# Patient Record
Sex: FEMALE | Race: WHITE | URBAN - METROPOLITAN AREA
[De-identification: names, ages, dates, MRNs, and addresses within clinical notes are randomized per-mention and may not be internally consistent; named-entity substitution may affect disease eponyms.]

---

## 2018-04-11 ENCOUNTER — ANESTHESIA EVENT (OUTPATIENT)
Dept: SURGERY | Facility: CLINIC | Age: 58
End: 2018-04-11
Payer: COMMERCIAL

## 2018-04-11 RX ORDER — SULFASALAZINE 500 MG/1
500 TABLET ORAL 2 TIMES DAILY
COMMUNITY

## 2018-04-12 ENCOUNTER — HOSPITAL ENCOUNTER (OUTPATIENT)
Facility: CLINIC | Age: 58
Discharge: HOME | End: 2018-04-12
Attending: OPHTHALMOLOGY | Admitting: OPHTHALMOLOGY
Payer: COMMERCIAL

## 2018-04-12 ENCOUNTER — SURGERY (OUTPATIENT)
Age: 58
End: 2018-04-12

## 2018-04-12 ENCOUNTER — ANESTHESIA (OUTPATIENT)
Dept: SURGERY | Facility: CLINIC | Age: 58
End: 2018-04-12
Payer: COMMERCIAL

## 2018-04-12 VITALS
DIASTOLIC BLOOD PRESSURE: 84 MMHG | BODY MASS INDEX: 28.72 KG/M2 | OXYGEN SATURATION: 97 % | TEMPERATURE: 98.5 F | WEIGHT: 183 LBS | RESPIRATION RATE: 16 BRPM | SYSTOLIC BLOOD PRESSURE: 141 MMHG | HEIGHT: 67 IN

## 2018-04-12 DIAGNOSIS — Z98.890 POSTOPERATIVE EYE STATE: Primary | ICD-10-CM

## 2018-04-12 PROCEDURE — 71000012 ZZH RECOVERY PHASE 1 LEVEL 1 FIRST HR: Performed by: OPHTHALMOLOGY

## 2018-04-12 PROCEDURE — 71000013 ZZH RECOVERY PHASE 1 LEVEL 1 EA ADDTL HR: Performed by: OPHTHALMOLOGY

## 2018-04-12 PROCEDURE — 40000170 ZZH STATISTIC PRE-PROCEDURE ASSESSMENT II: Performed by: OPHTHALMOLOGY

## 2018-04-12 PROCEDURE — 25000128 H RX IP 250 OP 636: Performed by: ANESTHESIOLOGY

## 2018-04-12 PROCEDURE — 37000008 ZZH ANESTHESIA TECHNICAL FEE, 1ST 30 MIN: Performed by: OPHTHALMOLOGY

## 2018-04-12 PROCEDURE — 25000132 ZZH RX MED GY IP 250 OP 250 PS 637: Performed by: OPHTHALMOLOGY

## 2018-04-12 PROCEDURE — 36000058 ZZH SURGERY LEVEL 3 EA 15 ADDTL MIN: Performed by: OPHTHALMOLOGY

## 2018-04-12 PROCEDURE — 25000125 ZZHC RX 250: Performed by: NURSE ANESTHETIST, CERTIFIED REGISTERED

## 2018-04-12 PROCEDURE — 37000009 ZZH ANESTHESIA TECHNICAL FEE, EACH ADDTL 15 MIN: Performed by: OPHTHALMOLOGY

## 2018-04-12 PROCEDURE — 25000128 H RX IP 250 OP 636: Performed by: NURSE ANESTHETIST, CERTIFIED REGISTERED

## 2018-04-12 PROCEDURE — 36000056 ZZH SURGERY LEVEL 3 1ST 30 MIN: Performed by: OPHTHALMOLOGY

## 2018-04-12 PROCEDURE — 27210794 ZZH OR GENERAL SUPPLY STERILE: Performed by: OPHTHALMOLOGY

## 2018-04-12 PROCEDURE — 25000125 ZZHC RX 250: Performed by: OPHTHALMOLOGY

## 2018-04-12 PROCEDURE — 25000125 ZZHC RX 250: Performed by: ANESTHESIOLOGY

## 2018-04-12 PROCEDURE — 71000027 ZZH RECOVERY PHASE 2 EACH 15 MINS: Performed by: OPHTHALMOLOGY

## 2018-04-12 RX ORDER — MEPERIDINE HYDROCHLORIDE 25 MG/ML
12.5 INJECTION INTRAMUSCULAR; INTRAVENOUS; SUBCUTANEOUS
Status: DISCONTINUED | OUTPATIENT
Start: 2018-04-12 | End: 2018-04-12 | Stop reason: HOSPADM

## 2018-04-12 RX ORDER — NEOMYCIN SULFATE, POLYMYXIN B SULFATE AND DEXAMETHASONE 3.5; 10000; 1 MG/ML; [USP'U]/ML; MG/ML
1 SUSPENSION/ DROPS OPHTHALMIC 4 TIMES DAILY
Qty: 1 BOTTLE | Refills: 0 | Status: SHIPPED | OUTPATIENT
Start: 2018-04-12 | End: 2018-04-12

## 2018-04-12 RX ORDER — DEXAMETHASONE SODIUM PHOSPHATE 4 MG/ML
INJECTION, SOLUTION INTRA-ARTICULAR; INTRALESIONAL; INTRAMUSCULAR; INTRAVENOUS; SOFT TISSUE PRN
Status: DISCONTINUED | OUTPATIENT
Start: 2018-04-12 | End: 2018-04-12

## 2018-04-12 RX ORDER — ERYTHROMYCIN 5 MG/G
OINTMENT OPHTHALMIC
Qty: 3.5 G | Refills: 0 | Status: SHIPPED | OUTPATIENT
Start: 2018-04-12 | End: 2018-12-27

## 2018-04-12 RX ORDER — FENTANYL CITRATE 50 UG/ML
25-50 INJECTION, SOLUTION INTRAMUSCULAR; INTRAVENOUS EVERY 5 MIN PRN
Status: DISCONTINUED | OUTPATIENT
Start: 2018-04-12 | End: 2018-04-12 | Stop reason: HOSPADM

## 2018-04-12 RX ORDER — SODIUM CHLORIDE, SODIUM LACTATE, POTASSIUM CHLORIDE, CALCIUM CHLORIDE 600; 310; 30; 20 MG/100ML; MG/100ML; MG/100ML; MG/100ML
INJECTION, SOLUTION INTRAVENOUS CONTINUOUS
Status: DISCONTINUED | OUTPATIENT
Start: 2018-04-12 | End: 2018-04-12 | Stop reason: HOSPADM

## 2018-04-12 RX ORDER — ONDANSETRON 4 MG/1
4 TABLET, ORALLY DISINTEGRATING ORAL EVERY 30 MIN PRN
Status: DISCONTINUED | OUTPATIENT
Start: 2018-04-12 | End: 2018-04-12 | Stop reason: HOSPADM

## 2018-04-12 RX ORDER — TOBRAMYCIN AND DEXAMETHASONE 3; 1 MG/ML; MG/ML
1 SUSPENSION/ DROPS OPHTHALMIC 4 TIMES DAILY
Qty: 1 BOTTLE | Refills: 0 | Status: SHIPPED | OUTPATIENT
Start: 2018-04-12 | End: 2018-12-27

## 2018-04-12 RX ORDER — PROPOFOL 10 MG/ML
INJECTION, EMULSION INTRAVENOUS CONTINUOUS PRN
Status: DISCONTINUED | OUTPATIENT
Start: 2018-04-12 | End: 2018-04-12

## 2018-04-12 RX ORDER — ERYTHROMYCIN 5 MG/G
OINTMENT OPHTHALMIC PRN
Status: DISCONTINUED | OUTPATIENT
Start: 2018-04-12 | End: 2018-04-12 | Stop reason: HOSPADM

## 2018-04-12 RX ORDER — HYDROCODONE BITARTRATE AND ACETAMINOPHEN 5; 325 MG/1; MG/1
1 TABLET ORAL EVERY 6 HOURS PRN
Qty: 10 TABLET | Refills: 0 | Status: SHIPPED | OUTPATIENT
Start: 2018-04-12 | End: 2018-12-27

## 2018-04-12 RX ORDER — LIDOCAINE HYDROCHLORIDE AND EPINEPHRINE 10; 10 MG/ML; UG/ML
INJECTION, SOLUTION INFILTRATION; PERINEURAL PRN
Status: DISCONTINUED | OUTPATIENT
Start: 2018-04-12 | End: 2018-04-12 | Stop reason: HOSPADM

## 2018-04-12 RX ORDER — LIDOCAINE HYDROCHLORIDE 20 MG/ML
INJECTION, SOLUTION INFILTRATION; PERINEURAL PRN
Status: DISCONTINUED | OUTPATIENT
Start: 2018-04-12 | End: 2018-04-12

## 2018-04-12 RX ORDER — PROPOFOL 10 MG/ML
INJECTION, EMULSION INTRAVENOUS PRN
Status: DISCONTINUED | OUTPATIENT
Start: 2018-04-12 | End: 2018-04-12

## 2018-04-12 RX ORDER — NALOXONE HYDROCHLORIDE 0.4 MG/ML
.1-.4 INJECTION, SOLUTION INTRAMUSCULAR; INTRAVENOUS; SUBCUTANEOUS
Status: DISCONTINUED | OUTPATIENT
Start: 2018-04-12 | End: 2018-04-12 | Stop reason: HOSPADM

## 2018-04-12 RX ORDER — SODIUM CHLORIDE, SODIUM LACTATE, POTASSIUM CHLORIDE, CALCIUM CHLORIDE 600; 310; 30; 20 MG/100ML; MG/100ML; MG/100ML; MG/100ML
INJECTION, SOLUTION INTRAVENOUS CONTINUOUS PRN
Status: DISCONTINUED | OUTPATIENT
Start: 2018-04-12 | End: 2018-04-12

## 2018-04-12 RX ORDER — ONDANSETRON 2 MG/ML
4 INJECTION INTRAMUSCULAR; INTRAVENOUS EVERY 30 MIN PRN
Status: DISCONTINUED | OUTPATIENT
Start: 2018-04-12 | End: 2018-04-12 | Stop reason: HOSPADM

## 2018-04-12 RX ORDER — HYDROCODONE BITARTRATE AND ACETAMINOPHEN 5; 325 MG/1; MG/1
1 TABLET ORAL ONCE
Status: COMPLETED | OUTPATIENT
Start: 2018-04-12 | End: 2018-04-12

## 2018-04-12 RX ORDER — FENTANYL CITRATE 50 UG/ML
25-50 INJECTION, SOLUTION INTRAMUSCULAR; INTRAVENOUS
Status: DISCONTINUED | OUTPATIENT
Start: 2018-04-12 | End: 2018-04-12 | Stop reason: HOSPADM

## 2018-04-12 RX ORDER — FENTANYL CITRATE 50 UG/ML
INJECTION, SOLUTION INTRAMUSCULAR; INTRAVENOUS PRN
Status: DISCONTINUED | OUTPATIENT
Start: 2018-04-12 | End: 2018-04-12

## 2018-04-12 RX ORDER — HYDROMORPHONE HYDROCHLORIDE 1 MG/ML
.3-.5 INJECTION, SOLUTION INTRAMUSCULAR; INTRAVENOUS; SUBCUTANEOUS EVERY 10 MIN PRN
Status: DISCONTINUED | OUTPATIENT
Start: 2018-04-12 | End: 2018-04-12 | Stop reason: HOSPADM

## 2018-04-12 RX ADMIN — PROPOFOL 200 MCG/KG/MIN: 10 INJECTION, EMULSION INTRAVENOUS at 08:38

## 2018-04-12 RX ADMIN — PHENYLEPHRINE HYDROCHLORIDE 100 MCG: 10 INJECTION, SOLUTION INTRAMUSCULAR; INTRAVENOUS; SUBCUTANEOUS at 08:45

## 2018-04-12 RX ADMIN — LIDOCAINE HYDROCHLORIDE 60 MG: 20 INJECTION, SOLUTION INFILTRATION; PERINEURAL at 08:36

## 2018-04-12 RX ADMIN — ONDANSETRON 4 MG: 4 TABLET, ORALLY DISINTEGRATING ORAL at 12:22

## 2018-04-12 RX ADMIN — FENTANYL CITRATE 50 MCG: 50 INJECTION, SOLUTION INTRAMUSCULAR; INTRAVENOUS at 10:01

## 2018-04-12 RX ADMIN — SODIUM CHLORIDE, POTASSIUM CHLORIDE, SODIUM LACTATE AND CALCIUM CHLORIDE: 600; 310; 30; 20 INJECTION, SOLUTION INTRAVENOUS at 08:35

## 2018-04-12 RX ADMIN — SODIUM CHLORIDE, POTASSIUM CHLORIDE, SODIUM LACTATE AND CALCIUM CHLORIDE: 600; 310; 30; 20 INJECTION, SOLUTION INTRAVENOUS at 09:36

## 2018-04-12 RX ADMIN — PHENYLEPHRINE HYDROCHLORIDE 100 MCG: 10 INJECTION, SOLUTION INTRAMUSCULAR; INTRAVENOUS; SUBCUTANEOUS at 09:13

## 2018-04-12 RX ADMIN — HYDROMORPHONE HYDROCHLORIDE 0.5 MG: 1 INJECTION, SOLUTION INTRAMUSCULAR; INTRAVENOUS; SUBCUTANEOUS at 10:22

## 2018-04-12 RX ADMIN — PROPOFOL 200 MG: 10 INJECTION, EMULSION INTRAVENOUS at 08:36

## 2018-04-12 RX ADMIN — ERYTHROMYCIN 2 G: 5 OINTMENT OPHTHALMIC at 09:23

## 2018-04-12 RX ADMIN — DEXAMETHASONE SODIUM PHOSPHATE 4 MG: 4 INJECTION, SOLUTION INTRA-ARTICULAR; INTRALESIONAL; INTRAMUSCULAR; INTRAVENOUS; SOFT TISSUE at 08:38

## 2018-04-12 RX ADMIN — LIDOCAINE HYDROCHLORIDE AND EPINEPHRINE 9 ML: 10; 10 INJECTION, SOLUTION INFILTRATION; PERINEURAL at 08:55

## 2018-04-12 RX ADMIN — HYDROMORPHONE HYDROCHLORIDE 0.5 MG: 1 INJECTION, SOLUTION INTRAMUSCULAR; INTRAVENOUS; SUBCUTANEOUS at 09:48

## 2018-04-12 RX ADMIN — MIDAZOLAM 2 MG: 1 INJECTION INTRAMUSCULAR; INTRAVENOUS at 08:35

## 2018-04-12 RX ADMIN — PHENYLEPHRINE HYDROCHLORIDE 100 MCG: 10 INJECTION, SOLUTION INTRAMUSCULAR; INTRAVENOUS; SUBCUTANEOUS at 09:00

## 2018-04-12 RX ADMIN — FENTANYL CITRATE 50 MCG: 50 INJECTION, SOLUTION INTRAMUSCULAR; INTRAVENOUS at 08:35

## 2018-04-12 RX ADMIN — HYDROCODONE BITARTRATE AND ACETAMINOPHEN 1 TABLET: 5; 325 TABLET ORAL at 11:21

## 2018-04-12 RX ADMIN — PHENYLEPHRINE HYDROCHLORIDE 100 MCG: 10 INJECTION, SOLUTION INTRAMUSCULAR; INTRAVENOUS; SUBCUTANEOUS at 09:17

## 2018-04-12 NOTE — IP AVS SNAPSHOT
MRN:[id number]                      After Visit Summary   4/12/2018    Sybil Hess    MRN: [id number]           Thank you!     Thank you for choosing [facility] for your care. Our goal is always to provide you with excellent care. Hearing back from our patients is one way we can continue to improve our services. Please take a few minutes to complete the written survey that you may receive in the mail after you visit with us. Thank you!        Patient Information     Date Of Birth          1960        About your hospital stay     You were admitted on:  April 12, 2018 [facility]    You were discharged on:  April 12, 2018       Who to Call     For medical emergencies, please call 911.  For non-urgent questions about your medical care, please call your primary care provider or clinic, [phone]  For questions related to your surgery, please call your surgery clinic        Attending Provider     Provider Specialty    Jose Marx MD Ophthalmology       Primary Care Provider Office Phone # Fax #    Patricia ISAIAS Olmos [phone]      After Care Instructions     Discharge Medication Instructions       Do NOT take aspirin or medications containing NSAIDS for 72 hours after procedure.            Ice to affected area       Apply cold pack for 15 minutes on, 15 minutes off, for 48 hours while awake.                  Your next 10 appointments already scheduled     Apr 12, 2018 11:30 AM CDT   Non Covered with Non Covered Provider    ADMITTING (--)    [address], [address]  [facility] [address] [phone]              Further instructions from your care team       Post-operative Instructions            Ophthalmic Plastic and Reconstructive Surgery  Jose Marx M.D.  Yue Leung M.D.    All instructions apply to the operated eye(s) or eyelid(s)      What to expect after surgery:    Thre will be  some swelling, bruising, and likely a black eye (even into the lower eyelids and cheeks). Also expect crusting and discharge from the eye and/or incisions.     A small amount of surface bleeding is normal for the first 48 hours after surgery.    You may notice some bloody tears for the first few days after surgery. This is normal.    Your eye(s) and eyelid(s) may be painful and tender. This is normal after surgery. Use the pain medication as prescribed. If your pain does not improve despite the medication, contact the office.    Wound care and personal care:    If a patch or bandage has been placed, please leave this in place until seen in clinic. Prevent the bandage from getting wet.     Apply ice compresses 15 minutes on 15 minutes off while awake for the first 2 days after surgery, then switch to warm compresses 4 times a day until seen by your physician.     For warm packs you can place a cup of dry uncooked rice in a clean cotton sock. Place sock in microwave 30 seconds to one minute. Next place the warm sock into a plastic bag and wrap the bag with clean warm wet washcloth and place over operated eye.      You may shower or wash your hair the day after surgery. Do not bathe or go swimming for 1 week to prevent contamination of your wounds.    Do not apply make-up to the eyes or eyelids for 2 weeks after surgery.      Activity restrictions and driving:    Avoid heavy lifting, bending, exercise or strenuous activity for 1 week after surgery.    You may resume other activities and return to work as tolerated.    You may not resume driving until have you stopped using narcotic pain medications(such as Norco, Percocet, Tylenol #3).    Medications:    Restart all your regular home medications and eye drops today. If you take Plavix or Aspirin on a regular basis, wait for 3 days after your surgery before restarting these in order to decrease the risk of bleeding complications.    Avoid aspirin and aspirin-like  medications (Motrin, Aleve, Ibuprofen, [place] etc) for 5 days to reduce the risk of bleeding. You may take Tylenol (acetaminophen) for pain.    In addition to your home medications, take the following post-operative medications as prescribed by your physician:    Apply antibiotic ointment (erythromycin) to all sutures three times a day, and into the operated eye(s) at night.     Instill eye drops (Maxitrol) four times a day until the bottle finished.     Take 1 to 2 pain pills (norco or tylenol 3 as prescribed) as needed for pain up to every 4 hours.    The pain pills may make you drowsy. You must not drive a car, operate heavy machinery or drink alcohol while taking them.    The pain pills may cause constipation and nausea. Take them with some food to prevent a stomach upset. If you continue to experience nausea, call your physician.      WARNING: All the prescription pain medications listed above contain Tylenol (acetaminophen). You must not take more than 4,000 mg of acetaminophen per 24-hour period. This is equivalent to 6 tablets of Darvocet, 8 tablets of Vicodin, or 12 tablets of Norco, Percocet or Tylenol #3. If you take other over-the-counter medications containing acetaminophen, you must take the amount of acetaminophen into account and reduce the number of prescribed pain pills accordingly.    Contact information and follow-up:    Return to the Eye Clinic for a follow-up appointment with your physician as  scheduled. If no appointment has been scheduled, call [phone] for an  appointment with Dr. Marx within 1 to 2 weeks from your date of surgery.    For severe pain, bleeding, or loss of vision, call the Eye Clinic at [phone].    An on call person can be reached after hours for concerns. The on call doctor should not call in medication refill requests after hours or on weekends, so please plan accordingly. An effort has been made to provide adequate pain medications following every  surgery, and refills will not be provided in most instances. Narcotic pain medications cannot be called in.     Same Day Surgery Discharge Instructions for  Sedation and General Anesthesia       It's not unusual to feel dizzy, light-headed or faint for up to 24 hours after surgery or while taking pain medication.  If you have these symptoms: sit for a few minutes before standing and have someone assist you when you get up to walk or use the bathroom.      You should rest and relax for the next 24 hours. We recommend you make arrangements to have an adult stay with you for at least 24 hours after your discharge.  Avoid hazardous and strenuous activity.      DO NOT DRIVE any vehicle or operate mechanical equipment for 24 hours following the end of your surgery.  Even though you may feel normal, your reactions may be affected by the medication you have received.      Do not drink alcoholic beverages for 24 hours following surgery.       Slowly progress to your regular diet as you feel able. It's not unusual to feel nauseated and/or vomit after receiving anesthesia.  If you develop these symptoms, drink clear liquids (apple juice, ginger ale, broth, 7-up, etc. ) until you feel better.  If your nausea and vomiting persists for 24 hours, please notify your surgeon.        All narcotic pain medications, along with inactivity and anesthesia, can cause constipation. Drinking plenty of liquids and increasing fiber intake will help.      For any questions of a medical nature, call your surgeon.      Do not make important decisions for 24 hours.      If you had general anesthesia, you may have a sore throat for a couple of days related to the breathing tube used during surgery.  You may use Cepacol lozenges to help with this discomfort.  If it worsens or if you develop a fever, contact your surgeon.       If you feel your pain is not well managed with the pain medications prescribed by your surgeon, please contact your  "surgeon's office to let them know so they can address your concerns.           **If you have questions or concerns about your procedure,   call Dr Marx at [phone](if you see him in [place]) or  [phone] (if you see him at the [place])**            Pending Results     Date and Time Order Name Status Description    4/10/2018 0000 EKG CARDIAC - HIM SCAN Preliminary             Admission Information     Date & Time Provider Department Dept. Phone    2018 Jose Marx MD [facility] Same Day Surgery [phone]      Your Vitals Were     Blood Pressure Temperature Respirations Height Weight Pulse Oximetry    136/77 98.5  F (36.9  C) (Temporal) 12 1.702 m (5' 7\") 83 kg (183 lb) 99%    BMI (Body Mass Index)                   28.66 kg/m2           MyChart Information     [employer] lets you send messages to your doctor, view your test results, renew your prescriptions, schedule appointments and more. To sign up, go to [email or web address] . Click on \"Log in\" on the left side of the screen, which will take you to the Welcome page. Then click on \"Sign up Now\" on the right side of the page.     You will be asked to enter the access code listed below, as well as some personal information. Please follow the directions to create your username and password.     Your access code is: [email or web address]  Expires: 2018 11:18 AM     Your access code will  in 90 days. If you need help or a new code, please call your [facility] clinic or [phone].        Care EveryWhere ID     This is your Care EveryWhere ID. This could be used by other organizations to access your [facility] medical records  [id number]        Equal Access to Services     YASHIRA JASON : Manoj Trujillo, sukumar sams, deb ridley, renetta katz. So [facility] [phone].    ATENCIÓN: Si habla español, tiene a harding disposición servicios gratuitos de asistencia lingüística. Llame al " [phone].    We comply with applicable federal civil rights laws and [place] laws. We do not discriminate on the basis of race, color, national origin, age, disability, sex, sexual orientation, or gender identity.               Review of your medicines      START taking        Dose / Directions    erythromycin ophthalmic ointment   Commonly known as:  ROMYCIN   Used for:  Postoperative eye state        Apply to skin incisions three times daily and into the eye at bedtime.   Quantity:  3.5 g   Refills:  0       HYDROcodone-acetaminophen 5-325 MG per tablet   Commonly known as:  NORCO   Used for:  Postoperative eye state        Dose:  1 tablet   Take 1 tablet by mouth every 6 hours as needed for pain Maximum of 4000 mg of acetaminophen in 24 hours.   Quantity:  10 tablet   Refills:  0       tobramycin-dexamethasone 0.3-0.1 % ophthalmic susp   Commonly known as:  TOBRADEX   Used for:  Postoperative eye state        Dose:  1 drop   Place 1 drop into both eyes 4 times daily   Quantity:  1 Bottle   Refills:  0         CONTINUE these medicines which have NOT CHANGED        Dose / Directions    ALEVE PO        Dose:  220 mg   Take 220 mg by mouth 2 times daily (with meals)   Refills:  0       AMLODIPINE BESYLATE PO        Dose:  2.5 mg   Take 2.5 mg by mouth daily   Refills:  0       IMITREX PO        Dose:  50 mg   Take 50 mg by mouth every 8 hours as needed for migraine   Refills:  0       SIMVASTATIN PO        Dose:  20 mg   Take 20 mg by mouth At Bedtime   Refills:  0       sulfaSALAzine 500 MG tablet   Commonly known as:  AZULFIDINE        Dose:  500 mg   Take 500 mg by mouth 2 times daily 2 tablets   Refills:  0            Where to get your medicines      These medications were sent to [facility] - [address]  [address] [facility] [address] [address]     Phone:  [phone]     erythromycin ophthalmic ointment    tobramycin-dexamethasone 0.3-0.1 % ophthalmic susp          Some of these will need a paper prescription and others can be bought over the counter. Ask your nurse if you have questions.     Bring a paper prescription for each of these medications     HYDROcodone-acetaminophen 5-325 MG per tablet                Protect others around you: Learn how to safely use, store and throw away your medicines at www.disposemymeds.org.        Information about OPIOIDS     PRESCRIPTION OPIOIDS: WHAT YOU NEED TO KNOW    Prescription opioids can be used to help relieve moderate to severe pain and are often prescribed following a surgery or injury, or for certain health conditions. These medications can be an important part of treatment but also come with serious risks. It is important to work with your health care provider to make sure you are getting the safest, most effective care.    WHAT ARE THE RISKS AND SIDE EFFECTS OF OPIOID USE?  Prescription opioids carry serious risks of addiction and overdose, especially with prolonged use. An opioid overdose, often marked by slowed breathing can cause sudden death. The use of prescription opioids can have a number of side effects as well, even when taken as directed:      Tolerance - meaning you might need to take more of a medication for the same pain relief    Physical dependence - meaning you have symptoms of withdrawal when a medication is stopped    Increased sensitivity to pain    Constipation    Nausea, vomiting, and dry mouth    Sleepiness and dizziness    Confusion    Depression    Low levels of testosterone that can result in lower sex drive, energy, and strength    Itching and sweating    RISKS ARE GREATER WITH:    History of drug misuse, substance use disorder, or overdose    Mental health conditions (such as depression or anxiety)    Sleep apnea    Older age (65 years or older)    Pregnancy    Avoid alcohol while taking prescription opioids.   Also, unless specifically advised by your health care provider, medications to avoid  include:    Benzodiazepines (such as Xanax or Valium)    Muscle relaxants (such as Soma or Flexeril)    Hypnotics (such as Ambien or Lunesta)    Other prescription opioids    KNOW YOUR OPTIONS:  Talk to your health care provider about ways to manage your pain that do not involve prescription opioids. Some of these options may actually work better and have fewer risks and side effects:    Pain relievers such as acetaminophen, ibuprofen, and naproxen    Some medications that are also used for depression or seizures    Physical therapy and exercise    Cognitive behavioral therapy, a psychological, goal-directed approach, in which patients learn how to modify physical, behavioral, and emotional triggers of pain and stress    IF YOU ARE PRESCRIBED OPIOIDS FOR PAIN:    Never take opioids in greater amounts or more often than prescribed    Follow up with your primary health care provider and work together to create a plan on how to manage your pain.    Talk about ways to help manage your pain that do not involve prescription opioids    Talk about all concerns and side effects    Help prevent misuse and abuse    Never sell or share prescription opioids    Never use another person's prescription opioids    Store prescription opioids in a secure place and out of reach of others (this may include visitors, children, friends, and family)    Visit www.cdc.gov/drugoverdose to learn about risks of opioid abuse and overdose    If you believe you may be struggling with addiction, tell your health care provider and ask for guidance or call [facility]'s National Helpline at 6-502-930-HELP    LEARN MORE / www.cdc.gov/drugoverdose/prescribing/guideline.html    Safely dispose of unused prescription opioids: Find your local drug take-back programs and more information about the importance of safe disposal at www.doseofreality.[place].gov             Medication List: This is a list of all your medications and when to take them. Check marks below  indicate your daily home schedule. Keep this list as a reference.      Medications           Morning Afternoon Evening Bedtime As Needed    ALEVE PO   Take 220 mg by mouth 2 times daily (with meals)                                AMLODIPINE BESYLATE PO   Take 2.5 mg by mouth daily                                erythromycin ophthalmic ointment   Commonly known as:  ROMYCIN   Apply to skin incisions three times daily and into the eye at bedtime.   Last time this was given:  2 g on 4/12/2018  9:23 AM                                HYDROcodone-acetaminophen 5-325 MG per tablet   Commonly known as:  NORCO   Take 1 tablet by mouth every 6 hours as needed for pain Maximum of 4000 mg of acetaminophen in 24 hours.   Last time this was given:  1 tablet on 4/12/2018 11:21 AM   Next Dose Due:  One pain pill taken at 11:20 am.                                IMITREX PO   Take 50 mg by mouth every 8 hours as needed for migraine                                SIMVASTATIN PO   Take 20 mg by mouth At Bedtime                                sulfaSALAzine 500 MG tablet   Commonly known as:  AZULFIDINE   Take 500 mg by mouth 2 times daily 2 tablets                                tobramycin-dexamethasone 0.3-0.1 % ophthalmic susp   Commonly known as:  TOBRADEX   Place 1 drop into both eyes 4 times daily

## 2018-04-12 NOTE — IP AVS SNAPSHOT
[facility] Same Day Surgery    [address]    [place] [address]    Phone:  [phone]    Fax:  [phone]                                       After Visit Summary   4/12/2018    Sybil Hess    MRN: [id number]           After Visit Summary Signature Page     I have received my discharge instructions, and my questions have been answered. I have discussed any challenges I see with this plan with the nurse or doctor.    ..........................................................................................................................................  Patient/Patient Representative Signature      ..........................................................................................................................................  Patient Representative Print Name and Relationship to Patient    ..................................................               ................................................  Date                                            Time    ..........................................................................................................................................  Reviewed by Signature/Title    ...................................................              ..............................................  Date                                                            Time

## 2018-04-12 NOTE — DISCHARGE INSTRUCTIONS
Post-operative Instructions            Ophthalmic Plastic and Reconstructive Surgery  Jose Marx M.D.  Yue Leung M.D.    All instructions apply to the operated eye(s) or eyelid(s)      What to expect after surgery:    Thre will be some swelling, bruising, and likely a black eye (even into the lower eyelids and cheeks). Also expect crusting and discharge from the eye and/or incisions.     A small amount of surface bleeding is normal for the first 48 hours after surgery.    You may notice some bloody tears for the first few days after surgery. This is normal.    Your eye(s) and eyelid(s) may be painful and tender. This is normal after surgery. Use the pain medication as prescribed. If your pain does not improve despite the medication, contact the office.    Wound care and personal care:    If a patch or bandage has been placed, please leave this in place until seen in clinic. Prevent the bandage from getting wet.     Apply ice compresses 15 minutes on 15 minutes off while awake for the first 2 days after surgery, then switch to warm compresses 4 times a day until seen by your physician.     For warm packs you can place a cup of dry uncooked rice in a clean cotton sock. Place sock in microwave 30 seconds to one minute. Next place the warm sock into a plastic bag and wrap the bag with clean warm wet washcloth and place over operated eye.      You may shower or wash your hair the day after surgery. Do not bathe or go swimming for 1 week to prevent contamination of your wounds.    Do not apply make-up to the eyes or eyelids for 2 weeks after surgery.      Activity restrictions and driving:    Avoid heavy lifting, bending, exercise or strenuous activity for 1 week after surgery.    You may resume other activities and return to work as tolerated.    You may not resume driving until have you stopped using narcotic pain medications(such as Norco, Percocet, Tylenol #3).    Medications:    Restart all your  regular home medications and eye drops today. If you take Plavix or Aspirin on a regular basis, wait for 3 days after your surgery before restarting these in order to decrease the risk of bleeding complications.    Avoid aspirin and aspirin-like medications (Motrin, Aleve, Ibuprofen, [place] etc) for 5 days to reduce the risk of bleeding. You may take Tylenol (acetaminophen) for pain.    In addition to your home medications, take the following post-operative medications as prescribed by your physician:    Apply antibiotic ointment (erythromycin) to all sutures three times a day, and into the operated eye(s) at night.     Instill eye drops (Maxitrol) four times a day until the bottle finished.     Take 1 to 2 pain pills (norco or tylenol 3 as prescribed) as needed for pain up to every 4 hours.    The pain pills may make you drowsy. You must not drive a car, operate heavy machinery or drink alcohol while taking them.    The pain pills may cause constipation and nausea. Take them with some food to prevent a stomach upset. If you continue to experience nausea, call your physician.      WARNING: All the prescription pain medications listed above contain Tylenol (acetaminophen). You must not take more than 4,000 mg of acetaminophen per 24-hour period. This is equivalent to 6 tablets of Darvocet, 8 tablets of Vicodin, or 12 tablets of Norco, Percocet or Tylenol #3. If you take other over-the-counter medications containing acetaminophen, you must take the amount of acetaminophen into account and reduce the number of prescribed pain pills accordingly.    Contact information and follow-up:    Return to the Eye Clinic for a follow-up appointment with your physician as  scheduled. If no appointment has been scheduled, call [phone] for an  appointment with Dr. Marx within 1 to 2 weeks from your date of surgery.    For severe pain, bleeding, or loss of vision, call the Eye Clinic at [phone].    An on call  person can be reached after hours for concerns. The on call doctor should not call in medication refill requests after hours or on weekends, so please plan accordingly. An effort has been made to provide adequate pain medications following every surgery, and refills will not be provided in most instances. Narcotic pain medications cannot be called in.     Same Day Surgery Discharge Instructions for  Sedation and General Anesthesia       It's not unusual to feel dizzy, light-headed or faint for up to 24 hours after surgery or while taking pain medication.  If you have these symptoms: sit for a few minutes before standing and have someone assist you when you get up to walk or use the bathroom.      You should rest and relax for the next 24 hours. We recommend you make arrangements to have an adult stay with you for at least 24 hours after your discharge.  Avoid hazardous and strenuous activity.      DO NOT DRIVE any vehicle or operate mechanical equipment for 24 hours following the end of your surgery.  Even though you may feel normal, your reactions may be affected by the medication you have received.      Do not drink alcoholic beverages for 24 hours following surgery.       Slowly progress to your regular diet as you feel able. It's not unusual to feel nauseated and/or vomit after receiving anesthesia.  If you develop these symptoms, drink clear liquids (apple juice, ginger ale, broth, 7-up, etc. ) until you feel better.  If your nausea and vomiting persists for 24 hours, please notify your surgeon.        All narcotic pain medications, along with inactivity and anesthesia, can cause constipation. Drinking plenty of liquids and increasing fiber intake will help.      For any questions of a medical nature, call your surgeon.      Do not make important decisions for 24 hours.      If you had general anesthesia, you may have a sore throat for a couple of days related to the breathing tube used during surgery.  You may  use Cepacol lozenges to help with this discomfort.  If it worsens or if you develop a fever, contact your surgeon.       If you feel your pain is not well managed with the pain medications prescribed by your surgeon, please contact your surgeon's office to let them know so they can address your concerns.           **If you have questions or concerns about your procedure,   call Dr Marx at [phone](if you see him in [place]) or  [phone] (if you see him at the [facility])**

## 2018-04-12 NOTE — ANESTHESIA CARE TRANSFER NOTE
Patient: Sybil Hess    Procedure(s):  BILATERAL UPPER LID BLEPHAROPLASTY AND COSMETIC BILATERAL LOWER LID BLEPHAROPLASTY  - Wound Class: I-Clean   - Wound Class: I-Clean    Diagnosis: BILATERAL UPPER LID DERMATOCHALASIS   Diagnosis Additional Information: No value filed.    Anesthesia Type:   General, LMA     Note:  Airway :Face Mask  Patient transferred to:PACU  Comments: At end of procedure, spontaneous respirations, adequate tidal volumes, followed commands to voice, LMA removed atraumatically, oropharynx suctioned, airway patent after LMA removal. Oxygen via facemask at 8 liters per minute to PACU. Oxygen tubing connected to wall O2 in PACU, SpO2, NiBP, and EKG monitors and alarms on and functioning, Pb Hugger warmer connected to patient gown, report on patient's clinical status given to PACU RN, RN questions answered.Handoff Report: Identifed the Patient, Identified the Reponsible Provider, Reviewed the pertinent medical history, Discussed the surgical course, Reviewed Intra-OP anesthesia mangement and issues during anesthesia, Set expectations for post-procedure period and Allowed opportunity for questions and acknowledgement of understanding      Vitals: (Last set prior to Anesthesia Care Transfer)    CRNA VITALS  4/12/2018 0906 - 4/12/2018 0942      4/12/2018             Resp Rate (set): 10                Electronically Signed By: ASAEL Barnett CRNA  April 12, 2018  9:42 AM

## 2018-04-12 NOTE — OP NOTE
PREOPERATIVE DIAGNOSES:   1. Bilateral upper eyelid dermatochalasis.  2. Bilateral lower eyelid steatochalasis and dermatochalasis.   POSTOPERATIVE DIAGNOSES:   1. Bilateral upper eyelid dermatochalasis.   2. Bilateral lower eyelid steatochalasis and dermatochalasis.   PROCEDURES:   1. Bilateral upper blepharoplasty.  2. Bilateral lower eyelid cosmetic blepharoplasty.   SURGEON: Jose Marx MD  ASSISTANT: Yue Leung MD and Acacia Green MD  ANESTHESIA: General with local infiltration of a 50/50 mixture of 2% lidocaine with epinephrine and 0.5% Marcaine.   COMPLICATIONS: None.   ESTIMATED BLOOD LOSS: Less than 5 mL.   HISTORY: Sybil Hess presented with upper lid drooping secondary to  dermatochalasis interfering with her vision. Sybil Hess also had lower lid dermatochalasis and steatochalasis and desired cosmetic correction. After risks, benefits and alternatives to the proposed procedure were explained, informed consent was obtained.   DESCRIPTION OF PROCEDURE: Sybil Hess  was brought to the operating room and placed supine on the operating table. IV sedation was given. The upper lid crease and excess upper eyelid skin was marked with a marking pen and the upper and lower eyelids infiltrated with local anesthetic.  The area was prepped and draped in the typical sterile fashion for oculoplastic surgery. Attention was directed to the left side. Lower lid was everted. Conjunctival incision was made with monopolar cautery. The Spike lid plate was used to protect the globe during this procedure. A Desmarres retractor was placed and dissection carried down through the lower lid retractors. The conjunctiva and lower lid retractors were tagged with a 4-0 silk suture to the drape superiorly. Dissection was carried over each fat pad. Each fat pad was identified and resected with monopolar cautery. Hemostasis was obtained. The inferior oblique muscle was identified and avoided. The silk  suture was removed. Attention directed to the right lower lid where the same procedure was performed. We then performed the pinch procedure on the skin of the right lower lid using the green fixation forcep. The skin was excised with the Lindsey scissors.  Hemostasis was obtained with the high temperature cautery. An orbicularis flap was created laterally and secured to the periosteum.  The skin was closed with running 6-0 plain gut suture. Attention was directed to the left lower lid where the pinch excision was performed as well.  Attention was directed to the right upper lid. Skin was incised following marked lines. Skin flap was excised with a high temperature cautery. A row of cautery was placed in the orbicularis along the inferior incision line.   The orbicularis and septum were opened nasally. A small amount of the nasal fat pad was excised with the cautery. The orbicularis was divided laterally with the cautery. Multiple 5-0 chromic gut sutures were used to secure the superior edge of orbicularis to the arcus marginalis to support the lateral brow. Hemostasis was obtained and the skin closed with running 6-0 plain gut suture. Attention was directed to the left side where the same procedure was performed. Erythromycin ointment was applied to the incision in the eyes. The patient tolerated the procedure well and left the operating room in stable condition.   GOSIA BUSBY MD

## 2018-04-12 NOTE — ANESTHESIA PREPROCEDURE EVALUATION
"  Anesthesia Evaluation     . Pt has had prior anesthetic.     No history of anesthetic complications          ROS/MED HX    ENT/Pulmonary:       Neurologic:       Cardiovascular:     (+) Dyslipidemia, hypertension----. : . . . :. . Previous cardiac testing Echodate:2011results:\"trace pulmonic regurgitation\"date: results:ECG reviewed date:4/10/2018 results:\"NSR\" date: results:          METS/Exercise Tolerance:     Hematologic:         Musculoskeletal:         GI/Hepatic:     (+) GERD Asymptomatic on medication,       Renal/Genitourinary:         Endo:         Psychiatric:         Infectious Disease:        (-) VRE   Malignancy:         Other:                     Physical Exam  Normal systems: cardiovascular and pulmonary    Airway   Mallampati: II  TM distance: >3 FB  Neck ROM: full    Dental     Cardiovascular       Pulmonary                     Anesthesia Plan      History & Physical Review  History and physical reviewed and following examination; no interval change.    ASA Status:  2 .    NPO Status:  > 8 hours    Plan for General and LMA with Intravenous and Propofol induction. Maintenance will be TIVA.    PONV prophylaxis:  Ondansetron (or other 5HT-3) and Dexamethasone or Solumedrol       Postoperative Care  Postoperative pain management:  IV analgesics and Oral pain medications.      Consents  Anesthetic plan, risks, benefits and alternatives discussed with:  Patient..        DPreop diagnosis: BILATERAL UPPER LID DERMATOCHALASIS   Procedure(s):  BLEPHAROPLASTY BILATERAL  COSMETIC BLEPHAROPLASTY LOWER LIDS BILATERAL  Allergies   Allergen Reactions     Nabumetone        No current facility-administered medications on file prior to encounter.   No current outpatient prescriptions on file prior to encounter.  No results found for: HGB, INR, POTASSIUM                    .  "

## 2018-04-12 NOTE — ANESTHESIA POSTPROCEDURE EVALUATION
Patient: Sybil Hess    Procedure(s):  BILATERAL UPPER LID BLEPHAROPLASTY AND COSMETIC BILATERAL LOWER LID BLEPHAROPLASTY  - Wound Class: I-Clean   - Wound Class: I-Clean    Diagnosis:BILATERAL UPPER LID DERMATOCHALASIS   Diagnosis Additional Information: No value filed.    Anesthesia Type:  General, LMA    Note:  Anesthesia Post Evaluation    Patient location during evaluation: PACU  Patient participation: Able to fully participate in evaluation  Level of consciousness: awake  Pain management: adequate  Airway patency: patent  Cardiovascular status: acceptable  Respiratory status: acceptable  Hydration status: acceptable  PONV: controlled     Anesthetic complications: None          Last vitals:  Vitals:    04/12/18 0945 04/12/18 0948 04/12/18 1000   BP: 136/86  142/85   Resp: 16 15 13   Temp:      SpO2: 99%  99%         Electronically Signed By: Yosef Leon MD  April 12, 2018  10:12 AM

## 2018-12-31 DIAGNOSIS — H91.93 DECREASED HEARING OF BOTH EARS: Primary | ICD-10-CM

## 2019-01-31 ENCOUNTER — OFFICE VISIT (OUTPATIENT)
Dept: OTOLARYNGOLOGY | Facility: OTHER | Age: 59
End: 2019-01-31
Attending: OTOLARYNGOLOGY
Payer: COMMERCIAL

## 2019-01-31 ENCOUNTER — OFFICE VISIT (OUTPATIENT)
Dept: AUDIOLOGY | Facility: OTHER | Age: 59
End: 2019-01-31
Attending: AUDIOLOGIST
Payer: COMMERCIAL

## 2019-01-31 VITALS
DIASTOLIC BLOOD PRESSURE: 82 MMHG | TEMPERATURE: 98.2 F | SYSTOLIC BLOOD PRESSURE: 108 MMHG | WEIGHT: 174 LBS | HEIGHT: 66 IN | HEART RATE: 79 BPM | BODY MASS INDEX: 27.97 KG/M2 | OXYGEN SATURATION: 95 %

## 2019-01-31 DIAGNOSIS — H90.3 SENSORINEURAL HEARING LOSS (SNHL) OF BOTH EARS: Primary | ICD-10-CM

## 2019-01-31 DIAGNOSIS — G51.0 LEFT-SIDED BELL'S PALSY: Primary | ICD-10-CM

## 2019-01-31 DIAGNOSIS — H90.3 SENSORINEURAL HEARING LOSS (SNHL) OF BOTH EARS: ICD-10-CM

## 2019-01-31 DIAGNOSIS — L43.8 ORAL LICHEN PLANUS: ICD-10-CM

## 2019-01-31 DIAGNOSIS — H81.22 VESTIBULAR NEURONITIS OF LEFT EAR: ICD-10-CM

## 2019-01-31 DIAGNOSIS — G51.0 UNILATERAL FACIAL PARESIS: ICD-10-CM

## 2019-01-31 DIAGNOSIS — H93.12 TINNITUS, LEFT: ICD-10-CM

## 2019-01-31 PROCEDURE — 92504 EAR MICROSCOPY EXAMINATION: CPT | Performed by: OTOLARYNGOLOGY

## 2019-01-31 PROCEDURE — 92550 TYMPANOMETRY & REFLEX THRESH: CPT | Performed by: AUDIOLOGIST

## 2019-01-31 PROCEDURE — 92557 COMPREHENSIVE HEARING TEST: CPT | Performed by: AUDIOLOGIST

## 2019-01-31 PROCEDURE — 99244 OFF/OP CNSLTJ NEW/EST MOD 40: CPT | Mod: 25 | Performed by: OTOLARYNGOLOGY

## 2019-01-31 RX ORDER — PREDNISONE 10 MG/1
TABLET ORAL
Qty: 105 TABLET | Refills: 0 | Status: SHIPPED | OUTPATIENT
Start: 2019-01-31

## 2019-01-31 ASSESSMENT — MIFFLIN-ST. JEOR: SCORE: 1386.01

## 2019-01-31 ASSESSMENT — PAIN SCALES - GENERAL: PAINLEVEL: NO PAIN (0)

## 2019-01-31 NOTE — LETTER
2019         RE: Sybil Hess  [address]  [facility] [address]        Dear Colleague,    Thank you for referring your patient, Sybil Hess, to the [facility]. Please see a copy of my visit note below.    Otolaryngology Consultation    Patient: Sybil Hess  : 1960    Patient presents with:  Consult: Bell's Palsy, Bilateral Hearing Loss; Referred by Nickolas      HPI:  Sybil Hess is a 58 year old female seen today for left-sided hearing loss and a history of Bell's palsy.  Onset of left facial weakness was 18.   She was diagnosed with Bell's palsy and had been seen by neurology.  She felt her facial weakness have been improving she is concerned that she may have had an ear infection in the left ear due to her diagnosis of [place].    She was in the emergency room at [facility] CAT scan of the brain was done in  which was negative  The visualized portions of the inner ear and mastoid were also normal    She started on prednisone and valacyclovir 1 gram bid x 7 days.  she then felt no better so we will complete another 7 days of valacyclovir    The dose of prednisone she was started on nor can she find this in her records but she states it was a high dose for at least 7-10 days    She denies any vesicles on her left ear or in her throat during her Bell's palsy    She does note left-sided hearing loss there is occasional bothersome tinnitus as well as fluctuating hearing loss with rotatory vertigo   No history of excessive noise exposure nor prior ear surgery.      Sybil states about 3 weeks before the onset of her facial weakness she had severe left otalgia left facial pain and vertigo lasting several hours.  She did feel she had some fluctuating hearing loss during this time and she had 3 similar episodes of this in the past but no past facial paralysis    She saw Patricia Olmos on  2018 and at that point was  diagnosed with acute suparative otitis media and treated with Augmentin.  She has not noticed any improvement in her ear symptoms with antibiotics.  She does have a history of eustachian tube dysfunction and had recurrent ear infections as a child.    I reviewed Dr. Casarez's notes and neurology dated 12/6/2018.  At that point she had 6 days of sudden onset of left facial weakness.  He did agree and diagnosed with left-sided Bell's palsy at that point she still had left upper and lower facial weakness with incomplete eye closure no evidence of vesicular lesions in the ear.  She is started on natural tears and eye lubricant and she was referred to physical therapy for her facial weakness    Audiogram 1/31/19 reveals a high-frequency symmetric sensorineural hearing loss near normal thresholds sloping to a moderate to moderate to severe symmetric sensorineural hearing loss.  Discrimination is normal SRT 20-25 dB and type a tympanograms bilaterally    She recently came down with an upper respiratory tract infection last week        Current Outpatient Rx   Medication Sig Dispense Refill     AMLODIPINE BESYLATE PO Take 2.5 mg by mouth daily       SIMVASTATIN PO Take 20 mg by mouth At Bedtime       sulfaSALAzine (AZULFIDINE) 500 MG tablet Take 500 mg by mouth 2 times daily 2 tablets         Allergies: Seasonal allergies and Nabumetone     Past Medical History:   Diagnosis Date     Allergic rhinitis      Arthritis      Degeneration of C5-C6 intervertebral disc      Gastroesophageal reflux disease      Hot flashes      Hyperlipidemia      Hypertension      Migraines      Primary insomnia      Trace pulmonic regurgitation by prior echocardiogram      Tremor        Past Surgical History:   Procedure Laterality Date     BLEPHAROPLASTY BILATERAL Bilateral 4/12/2018    Procedure: BLEPHAROPLASTY BILATERAL;  BILATERAL UPPER LID BLEPHAROPLASTY AND COSMETIC BILATERAL LOWER LID BLEPHAROPLASTY ;  Surgeon: Jose Marx MD;   "Location: [facility]     COLONOSCOPY  2015     COLONOSCOPY  09/2016     COSMETIC BLEPHAROPLASTY LOWER LIDS BILATERAL Bilateral 4/12/2018    Procedure: COSMETIC BLEPHAROPLASTY LOWER LIDS BILATERAL;;  Surgeon: Jose Marx MD;  Location: [facility]     ENT SURGERY      sinus surgery     GENITOURINARY SURGERY      TVT sling     GYN SURGERY      hysteroscopy, endometrial ablation, T.L.     HC ECHO TRANSTHORACIC, LIMITED W CONTRAST         ENT family history reviewed    Social History     Tobacco Use     Smoking status: Never Smoker     Smokeless tobacco: Never Used   Substance Use Topics     Alcohol use: Yes     Comment: socially     Drug use: No       Review of Systems  ROS: 10 point ROS neg other than the symptoms noted above in the HPI and neck pain    Physical Exam  /82 (BP Location: Right arm, Patient Position: Sitting, Cuff Size: Adult Large)   Pulse 79   Temp 98.2  F (36.8  C) (Tympanic)   Ht 1.435 m (4' 8.5\")   Wt 78.9 kg (174 lb)   SpO2 (!) 75%   BMI 38.32 kg/m     General - The patient is well nourished and well developed, and appears to have good nutritional status.  Alert and oriented to person and place, answers questions and cooperates with examination appropriately.   Gait is intact no spontaneous nystagmus tongue is midline shoulder shrug is symmetric  Head and Face - Normocephalic and atraumatic, with no gross asymmetry noted.    Facial Nerve-Left: there is full eye closure normal brow elevation and very slight asymmetry with smile to the left lower face, House-Brackman 2/6 left  Normal facial mobility on the right House-Brackman 1 out of 6  Voice and Breathing - The patient was breathing comfortably without the use of accessory muscles. There was no wheezing, stridor, or stertor.  The patients voice was clear and strong, and had appropriate pitch and quality.  No shelley peripheral digital clubbing or cyanosis   Ears -examined under microscopy bilaterally  The external auditory canals are patent, " the tympanic membranes are intact without effusion, retraction or mass.  Bony landmarks are intact.  Good mobility of the tympanic membrane bilaterally with Valsalva  There are no vesicles on the left pinna or in the left canal  Eyes - Extraocular movements intact, and the pupils were reactive to light.  Sclera were not icteric or injected, conjunctiva were pink and moist.  Mouth - Examination of the oral cavity showed pink, healthy oral mucosa. No lesions or ulcerations noted.  The tongue was mobile and midline, and the dentition were in good condition.  Right lateral tongue with a linear area of what appears to be lichen planus no ulceration she states she will get these every month smell with strawberry or tomato intake.  No oral cavity pain  Throat - The walls of the oropharynx were smooth, pink, moist, symmetric, and had no lesions or ulcerations.  The tonsillar pillars and soft palate were symmetric.  The uvula was midline on elevation.  No pharyngeal vesicles  Neck - No palpable enlarged fixed cervical lymph nodes.  No neck cysts or unusual tenderness to palpation.   No palpable fixed thyroid nodules or concerning goiter.  The trachea is grossly midline.   Nose - External contour is symmetric, no gross deflection or scars.  Nasal mucosa is pink and moist with no abnormal mucus.  The septum and turbinates were evaluated: non obstructive.  No polyps, masses, or purulence noted on examination.      Impression and Plan- Sybil Hess is a 58 year old female with:    ICD-10-CM    1. Left-sided Bell's palsy G51.0 predniSONE (DELTASONE) 10 MG tablet     PHYSICAL THERAPY REFERRAL   2. Unilateral facial paresis G51.0 PHYSICAL THERAPY REFERRAL   3. Sensorineural hearing loss (SNHL) of both ears H90.3 PHYSICAL THERAPY REFERRAL   4. 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 Tinnitus, left H93.12    6. Oral lichen planus L43.8          Complete Annual Audiogram  Start Prednisone if you  develop Bell's Palsy again  Continue Eye Drops at night  No evidence of ear infection  There is no indication for additional imaging at this point unless recurrent facial paralysis develops in that case I have given her a course of high-dose prednisone with a taper to start she is only to start this if recurrent paralysis occurs and to contact her primary to start valacyclovir again  If recurrent facial paralysis occurs I would then recommend MRI brain and IAC    Evaluation by vestibular PT it sounds like she is made may be having flares of vestibular neuronitis versus labyrinthitis.  I want her to present immediately for an audiogram if she feels her hearing is fluctuating    Normal ear exam today no other focal neurologic concerns    Follow up with ENT as needed    The current leading theory on tinnitus is that it originates from the central nervous system itself, and is usually associated with hearing loss and injury to cochlear hair cells.  Also discussed were steps that can be taken to mask the noise, such as a low volume de-tuned radio, a fan in the background, and hearing aids.  Correlation with stress, anxiety, depression, and high blood pressure were also discussed.  The patient was also cautioned on the numerous expensive non-pharmaceutical options that are advertised, and have no proven benefit.    The patient will follow up as necessary for worsening symptoms, changes in symptoms, or signs of infection.  I have also recommended yearly audiograms, and consideration of a hearing aid evaluation.          Vianey Musa D.O.  Otolaryngology/Head and Neck Surgery  Allergy      Again, thank you for allowing me to participate in the care of your patient.        Sincerely,        Vianey Musa MD

## 2019-01-31 NOTE — PATIENT INSTRUCTIONS
Thank you for allowing Dr. Musa and our ENT team to participate in your care.  If your medications are too expensive, please give the nurse a call.  We can possibly change this medication.  If you have a scheduling or an appointment question please contact our Health Unit Coordinator at their direct line [phone].   ALL nursing questions or concerns can be directed to your ENT nurse at: [phone] - [place]    Complete Annual Audiogram  Start Prednisone if you develop Bell's Palsy again  Continue Eye Drops at night  No evidence of ear infection  Complete MRI Brain and IAC  Follow up with ENT as needed

## 2019-01-31 NOTE — PROGRESS NOTES
Audiology Evaluation Completed. Please refer SCANNED AUDIOGRAM and/or TYMPANOGRAM for BACKGROUND, RESULTS, RECOMMENDATIONS.      Jacqueline VALVERDE, [facility]  Audiologist #[id number]

## 2019-01-31 NOTE — PROGRESS NOTES
Otolaryngology Consultation    Patient: Sybil Hess  : 1960    Patient presents with:  Consult: Bell's Palsy, Bilateral Hearing Loss; Referred by Nickolas      HPI:  Sybil Hess is a 58 year old female seen today for left-sided hearing loss and a history of Bell's palsy.  Onset of left facial weakness was 18.   She was diagnosed with Bell's palsy and had been seen by neurology.  She felt her facial weakness have been improving she is concerned that she may have had an ear infection in the left ear due to her diagnosis of [place].    She was in the emergency room at [facility] CAT scan of the brain was done in  which was negative  The visualized portions of the inner ear and mastoid were also normal    She started on prednisone and valacyclovir 1 gram bid x 7 days.  she then felt no better so we will complete another 7 days of valacyclovir    The dose of prednisone she was started on nor can she find this in her records but she states it was a high dose for at least 7-10 days    She denies any vesicles on her left ear or in her throat during her Bell's palsy    She does note left-sided hearing loss there is occasional bothersome tinnitus as well as fluctuating hearing loss with rotatory vertigo   No history of excessive noise exposure nor prior ear surgery.      Sybil states about 3 weeks before the onset of her facial weakness she had severe left otalgia left facial pain and vertigo lasting several hours.  She did feel she had some fluctuating hearing loss during this time and she had 3 similar episodes of this in the past but no past facial paralysis    She saw Patricia Olmos on  2018 and at that point was diagnosed with acute suparative otitis media and treated with Augmentin.  She has not noticed any improvement in her ear symptoms with antibiotics.  She does have a history of eustachian tube dysfunction and had recurrent ear infections as a child.    I  reviewed Dr. Casarez's notes and neurology dated 12/6/2018.  At that point she had 6 days of sudden onset of left facial weakness.  He did agree and diagnosed with left-sided Bell's palsy at that point she still had left upper and lower facial weakness with incomplete eye closure no evidence of vesicular lesions in the ear.  She is started on natural tears and eye lubricant and she was referred to physical therapy for her facial weakness    Audiogram 1/31/19 reveals a high-frequency symmetric sensorineural hearing loss near normal thresholds sloping to a moderate to moderate to severe symmetric sensorineural hearing loss.  Discrimination is normal SRT 20-25 dB and type a tympanograms bilaterally    She recently came down with an upper respiratory tract infection last week        Current Outpatient Rx   Medication Sig Dispense Refill     AMLODIPINE BESYLATE PO Take 2.5 mg by mouth daily       SIMVASTATIN PO Take 20 mg by mouth At Bedtime       sulfaSALAzine (AZULFIDINE) 500 MG tablet Take 500 mg by mouth 2 times daily 2 tablets         Allergies: Seasonal allergies and Nabumetone     Past Medical History:   Diagnosis Date     Allergic rhinitis      Arthritis      Degeneration of C5-C6 intervertebral disc      Gastroesophageal reflux disease      Hot flashes      Hyperlipidemia      Hypertension      Migraines      Primary insomnia      Trace pulmonic regurgitation by prior echocardiogram      Tremor        Past Surgical History:   Procedure Laterality Date     BLEPHAROPLASTY BILATERAL Bilateral 4/12/2018    Procedure: BLEPHAROPLASTY BILATERAL;  BILATERAL UPPER LID BLEPHAROPLASTY AND COSMETIC BILATERAL LOWER LID BLEPHAROPLASTY ;  Surgeon: Jose Marx MD;  Location: [facility]     COLONOSCOPY  2015     COLONOSCOPY  09/2016     COSMETIC BLEPHAROPLASTY LOWER LIDS BILATERAL Bilateral 4/12/2018    Procedure: COSMETIC BLEPHAROPLASTY LOWER LIDS BILATERAL;;  Surgeon: Jose Marx MD;  Location: [facility]     ENT SURGERY       "sinus surgery     GENITOURINARY SURGERY      TVT sling     GYN SURGERY      hysteroscopy, endometrial ablation, T.L.     HC ECHO TRANSTHORACIC, LIMITED W CONTRAST         ENT family history reviewed    Social History     Tobacco Use     Smoking status: Never Smoker     Smokeless tobacco: Never Used   Substance Use Topics     Alcohol use: Yes     Comment: socially     Drug use: No       Review of Systems  ROS: 10 point ROS neg other than the symptoms noted above in the HPI and neck pain    Physical Exam  /82 (BP Location: Right arm, Patient Position: Sitting, Cuff Size: Adult Large)   Pulse 79   Temp 98.2  F (36.8  C) (Tympanic)   Ht 1.435 m (4' 8.5\")   Wt 78.9 kg (174 lb)   SpO2 (!) 75%   BMI 38.32 kg/m    General - The patient is well nourished and well developed, and appears to have good nutritional status.  Alert and oriented to person and place, answers questions and cooperates with examination appropriately.   Gait is intact no spontaneous nystagmus tongue is midline shoulder shrug is symmetric  Head and Face - Normocephalic and atraumatic, with no gross asymmetry noted.    Facial Nerve-Left: there is full eye closure normal brow elevation and very slight asymmetry with smile to the left lower face, House-Brackman 2/6 left  Normal facial mobility on the right House-Brackman 1 out of 6  Voice and Breathing - The patient was breathing comfortably without the use of accessory muscles. There was no wheezing, stridor, or stertor.  The patients voice was clear and strong, and had appropriate pitch and quality.  No shelley peripheral digital clubbing or cyanosis   Ears -examined under microscopy bilaterally  The external auditory canals are patent, the tympanic membranes are intact without effusion, retraction or mass.  Bony landmarks are intact.  Good mobility of the tympanic membrane bilaterally with Valsalva  There are no vesicles on the left pinna or in the left canal  Eyes - Extraocular movements " intact, and the pupils were reactive to light.  Sclera were not icteric or injected, conjunctiva were pink and moist.  Mouth - Examination of the oral cavity showed pink, healthy oral mucosa. No lesions or ulcerations noted.  The tongue was mobile and midline, and the dentition were in good condition.  Right lateral tongue with a linear area of what appears to be lichen planus no ulceration she states she will get these every month smell with strawberry or tomato intake.  No oral cavity pain  Throat - The walls of the oropharynx were smooth, pink, moist, symmetric, and had no lesions or ulcerations.  The tonsillar pillars and soft palate were symmetric.  The uvula was midline on elevation.  No pharyngeal vesicles  Neck - No palpable enlarged fixed cervical lymph nodes.  No neck cysts or unusual tenderness to palpation.   No palpable fixed thyroid nodules or concerning goiter.  The trachea is grossly midline.   Nose - External contour is symmetric, no gross deflection or scars.  Nasal mucosa is pink and moist with no abnormal mucus.  The septum and turbinates were evaluated: non obstructive.  No polyps, masses, or purulence noted on examination.      Impression and Plan- Sybil Hess is a 58 year old female with:    ICD-10-CM    1. Left-sided Bell's palsy G51.0 predniSONE (DELTASONE) 10 MG tablet     PHYSICAL THERAPY REFERRAL   2. Unilateral facial paresis G51.0 PHYSICAL THERAPY REFERRAL   3. Sensorineural hearing loss (SNHL) of both ears H90.3 PHYSICAL THERAPY REFERRAL   4. 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 Tinnitus, left H93.12    6. Oral lichen planus L43.8          Complete Annual Audiogram  Start Prednisone if you develop Bell's Palsy again  Continue Eye Drops at night  No evidence of ear infection  There is no indication for additional imaging at this point unless recurrent facial paralysis develops in that case I have given her a course of high-dose prednisone with a  taper to start she is only to start this if recurrent paralysis occurs and to contact her primary to start valacyclovir again  If recurrent facial paralysis occurs I would then recommend MRI brain and IAC    Evaluation by vestibular PT it sounds like she is made may be having flares of vestibular neuronitis versus labyrinthitis.  I want her to present immediately for an audiogram if she feels her hearing is fluctuating    Normal ear exam today no other focal neurologic concerns    Follow up with ENT as needed    The current leading theory on tinnitus is that it originates from the central nervous system itself, and is usually associated with hearing loss and injury to cochlear hair cells.  Also discussed were steps that can be taken to mask the noise, such as a low volume de-tuned radio, a fan in the background, and hearing aids.  Correlation with stress, anxiety, depression, and high blood pressure were also discussed.  The patient was also cautioned on the numerous expensive non-pharmaceutical options that are advertised, and have no proven benefit.    The patient will follow up as necessary for worsening symptoms, changes in symptoms, or signs of infection.  I have also recommended yearly audiograms, and consideration of a hearing aid evaluation.    Finally, I showed her the area in her right tongue that appears consistent with lichen planus if this enlarges ulcerates or otherwise is bothersome to her present back for biopsy.      Vianey Musa D.O.  Otolaryngology/Head and Neck Surgery  Allergy

## 2019-01-31 NOTE — NURSING NOTE
"Chief Complaint   Patient presents with     Consult     Bell's Palsy, Bilateral Hearing Loss; Referred by Nickolas       Initial /82 (BP Location: Right arm, Patient Position: Sitting, Cuff Size: Adult Large)   Pulse 79   Temp 98.2  F (36.8  C) (Tympanic)   Ht 1.435 m (4' 8.5\")   Wt 78.9 kg (174 lb)   SpO2 (!) 75%   BMI 38.32 kg/m   Estimated body mass index is 38.32 kg/m  as calculated from the following:    Height as of this encounter: 1.435 m (4' 8.5\").    Weight as of this encounter: 78.9 kg (174 lb).  Medication Reconciliation: complete    Carol David LPN  "

## 2019-01-31 NOTE — NURSING NOTE
"Chief Complaint   Patient presents with     Consult     Bell's Palsy, Bilateral Hearing Loss; Referred by Nickolas       Initial /82 (BP Location: Right arm, Patient Position: Sitting, Cuff Size: Adult Large)   Pulse 79   Temp 98.2  F (36.8  C) (Tympanic)   Ht 1.435 m (4' 8.5\")   Wt 78.9 kg (174 lb)   SpO2 95%   BMI 38.32 kg/m   Estimated body mass index is 38.32 kg/m  as calculated from the following:    Height as of this encounter: 1.435 m (4' 8.5\").    Weight as of this encounter: 78.9 kg (174 lb).  Medication Reconciliation: complete    Carol David LPN  "

## 2019-01-31 NOTE — NURSING NOTE
"Chief Complaint   Patient presents with     Consult     Bell's Palsy, Bilateral Hearing Loss; Referred by Nickolas       Initial /82 (BP Location: Right arm, Patient Position: Sitting, Cuff Size: Adult Large)   Pulse 79   Temp 98.2  F (36.8  C) (Tympanic)   Ht 1.676 m (5' 6\")   Wt 78.9 kg (174 lb)   SpO2 95%   BMI 28.08 kg/m   Estimated body mass index is 28.08 kg/m  as calculated from the following:    Height as of this encounter: 1.676 m (5' 6\").    Weight as of this encounter: 78.9 kg (174 lb).  Medication Reconciliation: complete    Carol David LPN  "

## (undated) DEVICE — ESU EYE HIGH TEMP 65410-183

## (undated) DEVICE — GLOVE PROTEXIS W/NEU-THERA 7.5  2D73TE75

## (undated) DEVICE — SOL NACL 0.9% IRRIG 1000ML BOTTLE 07138-09

## (undated) DEVICE — SU VICRYL 5-0 P-2 18" UND J503G

## (undated) DEVICE — SU CHROMIC 5-0 G-3 18" 792G

## (undated) DEVICE — ESU NDL COLORADO MICRO 3CM STR N103A

## (undated) DEVICE — LINEN TOWEL PACK X5 5464

## (undated) DEVICE — SU PLAIN 6-0 G-1DA 18" 770G

## (undated) DEVICE — GLOVE PROTEXIS MICRO 7.5  2D73PM75

## (undated) DEVICE — PACK OCULOPLATIC SEN15OCFSD

## (undated) DEVICE — SU SILK 4-0 G-3DA 18" 783G

## (undated) DEVICE — ESU PENCIL W/HOLSTER

## (undated) RX ORDER — ONDANSETRON 4 MG/1
TABLET, ORALLY DISINTEGRATING ORAL
Status: DISPENSED
Start: 2018-04-12

## (undated) RX ORDER — PROPOFOL 10 MG/ML
INJECTION, EMULSION INTRAVENOUS
Status: DISPENSED
Start: 2018-04-12

## (undated) RX ORDER — HYDROMORPHONE HYDROCHLORIDE 1 MG/ML
INJECTION, SOLUTION INTRAMUSCULAR; INTRAVENOUS; SUBCUTANEOUS
Status: DISPENSED
Start: 2018-04-12

## (undated) RX ORDER — FENTANYL CITRATE 50 UG/ML
INJECTION, SOLUTION INTRAMUSCULAR; INTRAVENOUS
Status: DISPENSED
Start: 2018-04-12

## (undated) RX ORDER — ONDANSETRON 2 MG/ML
INJECTION INTRAMUSCULAR; INTRAVENOUS
Status: DISPENSED
Start: 2018-04-12

## (undated) RX ORDER — DEXAMETHASONE SODIUM PHOSPHATE 4 MG/ML
INJECTION, SOLUTION INTRA-ARTICULAR; INTRALESIONAL; INTRAMUSCULAR; INTRAVENOUS; SOFT TISSUE
Status: DISPENSED
Start: 2018-04-12

## (undated) RX ORDER — HYDROCODONE BITARTRATE AND ACETAMINOPHEN 5; 325 MG/1; MG/1
TABLET ORAL
Status: DISPENSED
Start: 2018-04-12